# Patient Record
Sex: MALE | Race: WHITE | NOT HISPANIC OR LATINO | Employment: FULL TIME | URBAN - METROPOLITAN AREA
[De-identification: names, ages, dates, MRNs, and addresses within clinical notes are randomized per-mention and may not be internally consistent; named-entity substitution may affect disease eponyms.]

---

## 2017-06-14 ENCOUNTER — HOSPITAL ENCOUNTER (OUTPATIENT)
Dept: GENERAL RADIOLOGY | Facility: HOSPITAL | Age: 57
Discharge: HOME | End: 2017-06-14
Admitting: INTERNAL MEDICINE

## 2017-06-14 ENCOUNTER — TRANSCRIBE ORDERS (OUTPATIENT)
Dept: ADMINISTRATIVE | Facility: HOSPITAL | Age: 57
End: 2017-06-14

## 2017-06-14 DIAGNOSIS — R52 PAIN: Primary | ICD-10-CM

## 2017-06-14 PROCEDURE — 72110 X-RAY EXAM L-2 SPINE 4/>VWS: CPT

## 2018-03-04 ENCOUNTER — OFFICE VISIT (OUTPATIENT)
Dept: RETAIL CLINIC | Facility: CLINIC | Age: 58
End: 2018-03-04

## 2018-03-04 VITALS
TEMPERATURE: 96.8 F | BODY MASS INDEX: 32.51 KG/M2 | WEIGHT: 267 LBS | HEIGHT: 76 IN | HEART RATE: 67 BPM | DIASTOLIC BLOOD PRESSURE: 82 MMHG | OXYGEN SATURATION: 98 % | SYSTOLIC BLOOD PRESSURE: 142 MMHG | RESPIRATION RATE: 18 BRPM

## 2018-03-04 DIAGNOSIS — R68.89 FLU-LIKE SYMPTOMS: Primary | ICD-10-CM

## 2018-03-04 DIAGNOSIS — R50.9 FEVER, UNSPECIFIED FEVER CAUSE: ICD-10-CM

## 2018-03-04 DIAGNOSIS — J34.89 SINUS PRESSURE: ICD-10-CM

## 2018-03-04 LAB
EXPIRATION DATE: NORMAL
FLUAV AG NPH QL: NEGATIVE
FLUBV AG NPH QL: NEGATIVE
INTERNAL CONTROL: NORMAL
Lab: NORMAL

## 2018-03-04 PROCEDURE — 99203 OFFICE O/P NEW LOW 30 MIN: CPT | Performed by: NURSE PRACTITIONER

## 2018-03-04 PROCEDURE — 87804 INFLUENZA ASSAY W/OPTIC: CPT | Performed by: NURSE PRACTITIONER

## 2018-03-04 RX ORDER — AZITHROMYCIN 250 MG/1
TABLET, FILM COATED ORAL
Qty: 6 TABLET | Refills: 0 | Status: SHIPPED | OUTPATIENT
Start: 2018-03-04 | End: 2018-03-08

## 2018-03-04 RX ORDER — BENZONATATE 100 MG/1
100 CAPSULE ORAL 3 TIMES DAILY PRN
Qty: 15 CAPSULE | Refills: 0 | Status: SHIPPED | OUTPATIENT
Start: 2018-03-04 | End: 2018-03-09

## 2018-03-04 NOTE — PROGRESS NOTES
"Reynaldo Monge is a 58 y.o. male.   /82  Pulse 67  Temp 96.8 °F (36 °C) (Temporal Artery )   Resp 18  Ht 193 cm (76\")  Wt 121 kg (267 lb)  SpO2 98%  BMI 32.5 kg/m2      Flu Symptoms   This is a new problem. The current episode started yesterday. The problem has been rapidly worsening. Associated symptoms include chills, congestion, coughing, fatigue, a fever (101) and headaches. Pertinent negatives include no joint swelling, myalgias, nausea, neck pain, numbness, sore throat, swollen glands, urinary symptoms, vertigo, visual change, vomiting or weakness.      Pt states he tends to get sinusitis acute recurrent and this feels similar to when he has had it in the past. He has used multiple medication but a z-pack has worked the best for him.    + flu exposure within house. Has tamiflu at house already but has not taken.     The following portions of the patient's history were reviewed and updated as appropriate: allergies, current medications, past family history, past medical history, past social history, past surgical history and problem list.    Review of Systems   Constitutional: Positive for chills, fatigue and fever (101). Negative for activity change and appetite change.   HENT: Positive for congestion and rhinorrhea. Negative for ear pain, sinus pressure and sore throat.    Respiratory: Positive for cough.    Gastrointestinal: Negative for diarrhea, nausea and vomiting.   Musculoskeletal: Negative for joint swelling, myalgias and neck pain.   Neurological: Positive for headaches. Negative for vertigo, weakness and numbness.       Objective   Physical Exam   Constitutional: He appears well-developed and well-nourished.  Non-toxic appearance. He has a sickly appearance.   HENT:   Head: Normocephalic and atraumatic.   Nose: Mucosal edema and rhinorrhea present. Right sinus exhibits maxillary sinus tenderness. Left sinus exhibits maxillary sinus tenderness.   Mouth/Throat: Uvula is midline. " Posterior oropharyngeal erythema present. No tonsillar exudate.   Cardiovascular: Regular rhythm and normal heart sounds.    Pulmonary/Chest: Effort normal. He has no wheezes. He has no rhonchi (mild). He has no rales.   Lymphadenopathy:     He has no cervical adenopathy.   Skin: Skin is warm and dry.       Assessment/Plan   Polo was seen today for flu symptoms.    Diagnoses and all orders for this visit:    Flu-like symptoms  -     POC Influenza A / B    Fever, unspecified fever cause    Sinus pressure    Other orders  -     benzonatate (TESSALON PERLES) 100 MG capsule; Take 1 capsule by mouth 3 (Three) Times a Day As Needed for Cough for up to 5 days.  -     azithromycin (ZITHROMAX Z-MIRNA) 250 MG tablet; Take 2 tablets the first day, then 1 tablet daily for 4 days.      Results for orders placed or performed in visit on 03/04/18   POC Influenza A / B   Result Value Ref Range    Rapid Influenza A Ag NEGATIVE     Rapid Influenza B Ag NEGATIVE     Internal Control Passed Passed    Lot Number [id number]     Expiration Date 10942457        Restart tamiflu.   Advised this is likely a false negative influenza test. Pt states understanding.

## 2018-09-12 DIAGNOSIS — Z12.11 SCREENING FOR COLON CANCER: Primary | ICD-10-CM

## 2018-09-18 ENCOUNTER — LAB REQUISITION (OUTPATIENT)
Dept: LAB | Facility: HOSPITAL | Age: 58
End: 2018-09-18

## 2018-09-18 ENCOUNTER — OUTSIDE FACILITY SERVICE (OUTPATIENT)
Dept: GASTROENTEROLOGY | Facility: CLINIC | Age: 58
End: 2018-09-18

## 2018-09-18 DIAGNOSIS — Z86.010 HISTORY OF COLONIC POLYPS: ICD-10-CM

## 2018-09-18 DIAGNOSIS — D12.5 BENIGN NEOPLASM OF SIGMOID COLON: ICD-10-CM

## 2018-09-18 DIAGNOSIS — Z12.11 ENCOUNTER FOR SCREENING FOR MALIGNANT NEOPLASM OF COLON: ICD-10-CM

## 2018-09-18 DIAGNOSIS — D12.2 BENIGN NEOPLASM OF ASCENDING COLON: ICD-10-CM

## 2018-09-18 PROCEDURE — 45388 COLONOSCOPY W/ABLATION: CPT | Performed by: INTERNAL MEDICINE

## 2018-09-18 PROCEDURE — 88305 TISSUE EXAM BY PATHOLOGIST: CPT | Performed by: INTERNAL MEDICINE

## 2018-09-18 PROCEDURE — 45385 COLONOSCOPY W/LESION REMOVAL: CPT | Performed by: INTERNAL MEDICINE

## 2018-09-19 LAB
CYTO UR: NORMAL
LAB AP CASE REPORT: NORMAL
LAB AP CLINICAL INFORMATION: NORMAL
PATH REPORT.FINAL DX SPEC: NORMAL
PATH REPORT.GROSS SPEC: NORMAL

## 2018-12-18 ENCOUNTER — HOSPITAL ENCOUNTER (EMERGENCY)
Facility: HOSPITAL | Age: 58
Discharge: HOME | End: 2018-12-18
Attending: EMERGENCY MEDICINE | Admitting: EMERGENCY MEDICINE

## 2018-12-18 ENCOUNTER — APPOINTMENT (OUTPATIENT)
Dept: CT IMAGING | Facility: HOSPITAL | Age: 58
End: 2018-12-18

## 2018-12-18 VITALS
WEIGHT: 230 LBS | OXYGEN SATURATION: 97 % | SYSTOLIC BLOOD PRESSURE: 113 MMHG | DIASTOLIC BLOOD PRESSURE: 68 MMHG | BODY MASS INDEX: 28.6 KG/M2 | HEART RATE: 60 BPM | HEIGHT: 75 IN | RESPIRATION RATE: 22 BRPM | TEMPERATURE: 97.9 F

## 2018-12-18 DIAGNOSIS — N20.0 KIDNEY STONE: ICD-10-CM

## 2018-12-18 DIAGNOSIS — N23 RENAL COLIC ON RIGHT SIDE: Primary | ICD-10-CM

## 2018-12-18 LAB
ALBUMIN SERPL-MCNC: 5.06 G/DL (ref 3.2–4.8)
ALBUMIN/GLOB SERPL: 2.3 G/DL (ref 1.5–2.5)
ALP SERPL-CCNC: 67 U/L (ref 25–100)
ALT SERPL W P-5'-P-CCNC: 27 U/L (ref 7–40)
ANION GAP SERPL CALCULATED.3IONS-SCNC: 11 MMOL/L (ref 3–11)
AST SERPL-CCNC: 26 U/L (ref 0–33)
BASOPHILS # BLD AUTO: 0.05 10*3/MM3 (ref 0–0.2)
BASOPHILS NFR BLD AUTO: 0.6 % (ref 0–1)
BILIRUB SERPL-MCNC: 0.4 MG/DL (ref 0.3–1.2)
BUN BLD-MCNC: 21 MG/DL (ref 9–23)
BUN/CREAT SERPL: 19.1 (ref 7–25)
CALCIUM SPEC-SCNC: 9.8 MG/DL (ref 8.7–10.4)
CHLORIDE SERPL-SCNC: 106 MMOL/L (ref 99–109)
CO2 SERPL-SCNC: 21 MMOL/L (ref 20–31)
CREAT BLD-MCNC: 1.1 MG/DL (ref 0.6–1.3)
DEPRECATED RDW RBC AUTO: 48.2 FL (ref 37–54)
EOSINOPHIL # BLD AUTO: 0.1 10*3/MM3 (ref 0–0.3)
EOSINOPHIL NFR BLD AUTO: 1.2 % (ref 0–3)
ERYTHROCYTE [DISTWIDTH] IN BLOOD BY AUTOMATED COUNT: 13.4 % (ref 11.3–14.5)
GFR SERPL CREATININE-BSD FRML MDRD: 68 ML/MIN/1.73
GLOBULIN UR ELPH-MCNC: 2.2 GM/DL
GLUCOSE BLD-MCNC: 129 MG/DL (ref 70–100)
HCT VFR BLD AUTO: 49.1 % (ref 38.9–50.9)
HGB BLD-MCNC: 16.3 G/DL (ref 13.1–17.5)
HOLD SPECIMEN: NORMAL
HOLD SPECIMEN: NORMAL
IMM GRANULOCYTES # BLD: 0.02 10*3/MM3 (ref 0–0.03)
IMM GRANULOCYTES NFR BLD: 0.2 % (ref 0–0.6)
LIPASE SERPL-CCNC: 52 U/L (ref 6–51)
LYMPHOCYTES # BLD AUTO: 1.69 10*3/MM3 (ref 0.6–4.8)
LYMPHOCYTES NFR BLD AUTO: 20.6 % (ref 24–44)
MCH RBC QN AUTO: 32.9 PG (ref 27–31)
MCHC RBC AUTO-ENTMCNC: 33.2 G/DL (ref 32–36)
MCV RBC AUTO: 99 FL (ref 80–99)
MONOCYTES # BLD AUTO: 0.55 10*3/MM3 (ref 0–1)
MONOCYTES NFR BLD AUTO: 6.7 % (ref 0–12)
NEUTROPHILS # BLD AUTO: 5.79 10*3/MM3 (ref 1.5–8.3)
NEUTROPHILS NFR BLD AUTO: 70.7 % (ref 41–71)
PLATELET # BLD AUTO: 213 10*3/MM3 (ref 150–450)
PMV BLD AUTO: 9.9 FL (ref 6–12)
POTASSIUM BLD-SCNC: 4.2 MMOL/L (ref 3.5–5.5)
PROT SERPL-MCNC: 7.3 G/DL (ref 5.7–8.2)
RBC # BLD AUTO: 4.96 10*6/MM3 (ref 4.2–5.76)
SODIUM BLD-SCNC: 138 MMOL/L (ref 132–146)
WBC NRBC COR # BLD: 8.2 10*3/MM3 (ref 3.5–10.8)
WHOLE BLOOD HOLD SPECIMEN: NORMAL
WHOLE BLOOD HOLD SPECIMEN: NORMAL

## 2018-12-18 PROCEDURE — 99283 EMERGENCY DEPT VISIT LOW MDM: CPT

## 2018-12-18 PROCEDURE — 25010000002 ONDANSETRON PER 1 MG: Performed by: EMERGENCY MEDICINE

## 2018-12-18 PROCEDURE — 96375 TX/PRO/DX INJ NEW DRUG ADDON: CPT

## 2018-12-18 PROCEDURE — 83690 ASSAY OF LIPASE: CPT | Performed by: EMERGENCY MEDICINE

## 2018-12-18 PROCEDURE — 74176 CT ABD & PELVIS W/O CONTRAST: CPT

## 2018-12-18 PROCEDURE — 25010000002 FENTANYL CITRATE (PF) 100 MCG/2ML SOLUTION: Performed by: EMERGENCY MEDICINE

## 2018-12-18 PROCEDURE — 25010000002 KETOROLAC TROMETHAMINE PER 15 MG: Performed by: EMERGENCY MEDICINE

## 2018-12-18 PROCEDURE — 80053 COMPREHEN METABOLIC PANEL: CPT | Performed by: EMERGENCY MEDICINE

## 2018-12-18 PROCEDURE — 85025 COMPLETE CBC W/AUTO DIFF WBC: CPT | Performed by: EMERGENCY MEDICINE

## 2018-12-18 PROCEDURE — 96374 THER/PROPH/DIAG INJ IV PUSH: CPT

## 2018-12-18 RX ORDER — KETOROLAC TROMETHAMINE 15 MG/ML
10 INJECTION, SOLUTION INTRAMUSCULAR; INTRAVENOUS ONCE
Status: COMPLETED | OUTPATIENT
Start: 2018-12-18 | End: 2018-12-18

## 2018-12-18 RX ORDER — SODIUM CHLORIDE 0.9 % (FLUSH) 0.9 %
10 SYRINGE (ML) INJECTION AS NEEDED
Status: DISCONTINUED | OUTPATIENT
Start: 2018-12-18 | End: 2018-12-18 | Stop reason: HOSPADM

## 2018-12-18 RX ORDER — FENTANYL CITRATE 50 UG/ML
100 INJECTION, SOLUTION INTRAMUSCULAR; INTRAVENOUS
Status: DISCONTINUED | OUTPATIENT
Start: 2018-12-18 | End: 2018-12-18 | Stop reason: HOSPADM

## 2018-12-18 RX ORDER — ONDANSETRON 2 MG/ML
4 INJECTION INTRAMUSCULAR; INTRAVENOUS ONCE
Status: COMPLETED | OUTPATIENT
Start: 2018-12-18 | End: 2018-12-18

## 2018-12-18 RX ADMIN — ONDANSETRON 4 MG: 2 INJECTION INTRAMUSCULAR; INTRAVENOUS at 08:35

## 2018-12-18 RX ADMIN — KETOROLAC TROMETHAMINE 10 MG: 15 INJECTION INTRAMUSCULAR; INTRAVENOUS at 08:35

## 2018-12-18 RX ADMIN — FENTANYL CITRATE 100 MCG: 50 INJECTION, SOLUTION INTRAMUSCULAR; INTRAVENOUS at 08:35

## 2018-12-18 NOTE — ED PROVIDER NOTES
Subjective   Claude Hardy Dungan Jr. is a 58 y.o.male who presents to the ED with complaints of flank pain. The patient reports he had dysuria (burning)  this morning and then had sudden onset of severe right sided flank pain which has gradually worsened since onset. He is unable to remain still and nothing he does relieves his pain. He states that he is nauseas and has been belching frequently. He also broke out into a damp sweat. He denies any hematuria, chest pain, palpitations, or vomiting. He states that he has no hx of kidney stones, but notes that he is likely severely dehydrated which could have triggered his kidney stone. He has been exercising frequently for the past 5 months and drinking a lot of coffee and eating a low carb diet. The patient reports drinking a bourbon daily and smoking about a pack of cigarettes a day. He is otherwise healthy and denies a hx of HTN. He notes that his brother has a hx of kidney stones. There are no other acute complaints at this time.        History provided by:  Patient  Flank Pain   Pain location:  R flank  Pain radiates to:  Does not radiate  Pain severity:  Severe  Onset quality:  Sudden  Duration:  2 hours  Timing:  Constant  Progression:  Worsening  Chronicity:  New  Context: diet changes    Relieved by:  Nothing  Worsened by:  Nothing  Ineffective treatments:  Movement and position changes  Associated symptoms: belching, dysuria (burning) and nausea    Associated symptoms: no chest pain, no hematuria and no vomiting    Risk factors: not elderly and not obese        Review of Systems   Cardiovascular: Negative for chest pain and palpitations.   Gastrointestinal: Positive for nausea. Negative for vomiting.   Genitourinary: Positive for dysuria (burning) and flank pain. Negative for hematuria.   All other systems reviewed and are negative.      No past medical history on file.    No Known Allergies    No past surgical history on file.    No family history on  file.    Social History     Socioeconomic History   • Marital status: Single     Spouse name: Not on file   • Number of children: Not on file   • Years of education: Not on file   • Highest education level: Not on file   Tobacco Use   • Smoking status: Never Smoker         Objective   Physical Exam   Constitutional: He is oriented to person, place, and time. He appears well-developed and well-nourished. No distress.   HENT:   Head: Normocephalic and atraumatic.   Nose: Nose normal.   Dry mouth. Pharynx benign. Airway patent.    Eyes: Conjunctivae are normal. No scleral icterus.   Neck: Normal range of motion. Neck supple.   Cardiovascular: Normal rate, regular rhythm and normal heart sounds.   No murmur heard.  Pulmonary/Chest: Effort normal and breath sounds normal. No respiratory distress.   Abdominal: Soft. Bowel sounds are normal. There is tenderness in the right lower quadrant. There is CVA tenderness (right). There is no guarding.   Musculoskeletal: Normal range of motion. He exhibits no edema.   Neurological: He is alert and oriented to person, place, and time.   Skin: Skin is warm and dry.   Psychiatric: He has a normal mood and affect. His behavior is normal.   Nursing note and vitals reviewed.      Procedures         ED Course  ED Course as of Dec 18 1458   Tue Dec 18, 2018   0900 Reevaluated the patient at bedside and updated him on findings and plan. He informs me that he is currently pain free.   [TJ]   4743 He says his blood pressure is always normal, so hypertension here likely due to pain.  [LI]      ED Course User Index  [LI] Ryan Jeter MD  [TJ] Javier Sutherland     Recent Results (from the past 24 hour(s))   Comprehensive Metabolic Panel    Collection Time: 12/18/18  8:12 AM   Result Value Ref Range    Glucose 129 (H) 70 - 100 mg/dL    BUN 21 9 - 23 mg/dL    Creatinine 1.10 0.60 - 1.30 mg/dL    Sodium 138 132 - 146 mmol/L    Potassium 4.2 3.5 - 5.5 mmol/L    Chloride 106 99 - 109 mmol/L    CO2  21.0 20.0 - 31.0 mmol/L    Calcium 9.8 8.7 - 10.4 mg/dL    Total Protein 7.3 5.7 - 8.2 g/dL    Albumin 5.06 (H) 3.20 - 4.80 g/dL    ALT (SGPT) 27 7 - 40 U/L    AST (SGOT) 26 0 - 33 U/L    Alkaline Phosphatase 67 25 - 100 U/L    Total Bilirubin 0.4 0.3 - 1.2 mg/dL    eGFR Non African Amer 68 >60 mL/min/1.73    Globulin 2.2 gm/dL    A/G Ratio 2.3 1.5 - 2.5 g/dL    BUN/Creatinine Ratio 19.1 7.0 - 25.0    Anion Gap 11.0 3.0 - 11.0 mmol/L   Lipase    Collection Time: 12/18/18  8:12 AM   Result Value Ref Range    Lipase 52 (H) 6 - 51 U/L   Light Blue Top    Collection Time: 12/18/18  8:12 AM   Result Value Ref Range    Extra Tube hold for add-on    Green Top (Gel)    Collection Time: 12/18/18  8:12 AM   Result Value Ref Range    Extra Tube Hold for add-ons.    Lavender Top    Collection Time: 12/18/18  8:12 AM   Result Value Ref Range    Extra Tube hold for add-on    Gold Top - SST    Collection Time: 12/18/18  8:12 AM   Result Value Ref Range    Extra Tube Hold for add-ons.    CBC Auto Differential    Collection Time: 12/18/18  8:12 AM   Result Value Ref Range    WBC 8.20 3.50 - 10.80 10*3/mm3    RBC 4.96 4.20 - 5.76 10*6/mm3    Hemoglobin 16.3 13.1 - 17.5 g/dL    Hematocrit 49.1 38.9 - 50.9 %    MCV 99.0 80.0 - 99.0 fL    MCH 32.9 (H) 27.0 - 31.0 pg    MCHC 33.2 32.0 - 36.0 g/dL    RDW 13.4 11.3 - 14.5 %    RDW-SD 48.2 37.0 - 54.0 fl    MPV 9.9 6.0 - 12.0 fL    Platelets 213 150 - 450 10*3/mm3    Neutrophil % 70.7 41.0 - 71.0 %    Lymphocyte % 20.6 (L) 24.0 - 44.0 %    Monocyte % 6.7 0.0 - 12.0 %    Eosinophil % 1.2 0.0 - 3.0 %    Basophil % 0.6 0.0 - 1.0 %    Immature Grans % 0.2 0.0 - 0.6 %    Neutrophils, Absolute 5.79 1.50 - 8.30 10*3/mm3    Lymphocytes, Absolute 1.69 0.60 - 4.80 10*3/mm3    Monocytes, Absolute 0.55 0.00 - 1.00 10*3/mm3    Eosinophils, Absolute 0.10 0.00 - 0.30 10*3/mm3    Basophils, Absolute 0.05 0.00 - 0.20 10*3/mm3    Immature Grans, Absolute 0.02 0.00 - 0.03 10*3/mm3     Note: In addition to lab  "results from this visit, the labs listed above may include labs taken at another facility or during a different encounter within the last 24 hours. Please correlate lab times with ED admission and discharge times for further clarification of the services performed during this visit.    CT Abdomen Pelvis Without Contrast   Final Result   There is a small, 4 mm calcification in the region of the   extreme distal aspect of the right ureter, likely the intramuscular   portion. There is mild to moderate hydronephrosis on the right.       D:  12/18/2018   E:  12/18/2018       This report was finalized on 12/18/2018 2:04 PM by Dr. Fred Padilla MD.            Vitals:    12/18/18 0807 12/18/18 0900 12/18/18 0930   BP: (!) 199/99 128/71 113/68   BP Location: Left arm     Patient Position: Sitting     Pulse: 60     Resp: 22     Temp: 97.9 °F (36.6 °C)     TempSrc: Oral     SpO2: 98% 96% 97%   Weight: 104 kg (230 lb)     Height: 190.5 cm (75\")       Medications   ondansetron (ZOFRAN) injection 4 mg (4 mg Intravenous Given 12/18/18 0835)   ketorolac (TORADOL) injection 10 mg (10 mg Intravenous Given 12/18/18 0835)     ECG/EMG Results (last 24 hours)     ** No results found for the last 24 hours. **                        [facility]    Final diagnoses:   Renal colic on right side   Kidney stone       Documentation assistance provided by margie Sutherland.  Information recorded by the scribzoë was done at my direction and has been verified and validated by me.     Javier Sutherland  12/18/18 0855       Javier Sutherland  12/18/18 0943       Ryan Jeter MD  12/18/18 [id number]    "

## 2018-12-18 NOTE — DISCHARGE INSTRUCTIONS
Strain your urine to search for kidney stone. Follow up with Dr. Land if you are unable to pass kidney stone.     Immediately return to the ED for worsening or new concerning symptoms.

## 2022-10-19 ENCOUNTER — TRANSCRIBE ORDERS (OUTPATIENT)
Dept: ADMINISTRATIVE | Facility: HOSPITAL | Age: 62
End: 2022-10-19

## 2022-10-19 DIAGNOSIS — Z87.891 SMOKING HISTORY: Primary | ICD-10-CM

## 2022-10-24 ENCOUNTER — APPOINTMENT (OUTPATIENT)
Dept: CT IMAGING | Facility: HOSPITAL | Age: 62
End: 2022-10-24

## 2022-11-21 ENCOUNTER — APPOINTMENT (OUTPATIENT)
Dept: CT IMAGING | Facility: HOSPITAL | Age: 62
End: 2022-11-21

## 2023-01-24 ENCOUNTER — HOSPITAL ENCOUNTER (OUTPATIENT)
Dept: CT IMAGING | Facility: HOSPITAL | Age: 63
Discharge: HOME | End: 2023-01-24
Admitting: INTERNAL MEDICINE
Payer: MEDICARE

## 2023-01-24 DIAGNOSIS — Z87.891 SMOKING HISTORY: ICD-10-CM

## 2023-01-24 PROCEDURE — 71271 CT THORAX LUNG CANCER SCR C-: CPT

## 2023-11-09 ENCOUNTER — TRANSCRIBE ORDERS (OUTPATIENT)
Dept: ADMINISTRATIVE | Facility: HOSPITAL | Age: 63
End: 2023-11-09
Payer: MEDICARE

## 2023-11-09 DIAGNOSIS — R41.3 MEMORY LOSS: Primary | ICD-10-CM

## 2023-12-28 ENCOUNTER — HOSPITAL ENCOUNTER (OUTPATIENT)
Dept: MRI IMAGING | Facility: HOSPITAL | Age: 63
Discharge: HOME | End: 2023-12-28
Admitting: INTERNAL MEDICINE
Payer: MEDICARE

## 2023-12-28 DIAGNOSIS — R41.3 MEMORY LOSS: ICD-10-CM

## 2023-12-28 PROCEDURE — 70551 MRI BRAIN STEM W/O DYE: CPT

## 2024-03-15 ENCOUNTER — TRANSCRIBE ORDERS (OUTPATIENT)
Dept: ADMINISTRATIVE | Facility: HOSPITAL | Age: 64
End: 2024-03-15
Payer: MEDICARE

## 2024-03-15 DIAGNOSIS — Z87.891 PERSONAL HISTORY OF TOBACCO USE, PRESENTING HAZARDS TO HEALTH: Primary | ICD-10-CM

## 2024-03-20 ENCOUNTER — HOSPITAL ENCOUNTER (OUTPATIENT)
Dept: CT IMAGING | Facility: HOSPITAL | Age: 64
Discharge: HOME | End: 2024-03-20
Payer: MEDICARE

## 2024-03-20 DIAGNOSIS — Z87.891 PERSONAL HISTORY OF TOBACCO USE, PRESENTING HAZARDS TO HEALTH: ICD-10-CM

## 2024-04-09 ENCOUNTER — HOSPITAL ENCOUNTER (OUTPATIENT)
Dept: CT IMAGING | Facility: HOSPITAL | Age: 64
Discharge: HOME | End: 2024-04-09
Admitting: INTERNAL MEDICINE
Payer: MEDICARE

## 2024-04-09 PROCEDURE — 71271 CT THORAX LUNG CANCER SCR C-: CPT

## 2024-04-15 ENCOUNTER — TRANSCRIBE ORDERS (OUTPATIENT)
Dept: ADMINISTRATIVE | Facility: HOSPITAL | Age: 64
End: 2024-04-15
Payer: MEDICARE

## 2024-04-15 DIAGNOSIS — K80.50 GALLSTONES, COMMON BILE DUCT: Primary | ICD-10-CM

## 2024-05-07 ENCOUNTER — HOSPITAL ENCOUNTER (OUTPATIENT)
Dept: ULTRASOUND IMAGING | Facility: HOSPITAL | Age: 64
Discharge: HOME | End: 2024-05-07
Admitting: INTERNAL MEDICINE
Payer: MEDICARE

## 2024-05-07 DIAGNOSIS — K80.50 GALLSTONES, COMMON BILE DUCT: ICD-10-CM

## 2024-05-07 PROCEDURE — 76705 ECHO EXAM OF ABDOMEN: CPT
